# Patient Record
Sex: FEMALE | Race: WHITE | Employment: OTHER | URBAN - METROPOLITAN AREA
[De-identification: names, ages, dates, MRNs, and addresses within clinical notes are randomized per-mention and may not be internally consistent; named-entity substitution may affect disease eponyms.]

---

## 2022-11-15 ENCOUNTER — HOSPITAL ENCOUNTER (EMERGENCY)
Age: 52
Discharge: HOME | End: 2022-11-15
Payer: MEDICARE

## 2022-11-15 VITALS
DIASTOLIC BLOOD PRESSURE: 88 MMHG | HEART RATE: 76 BPM | SYSTOLIC BLOOD PRESSURE: 148 MMHG | OXYGEN SATURATION: 96 % | TEMPERATURE: 98.6 F | RESPIRATION RATE: 16 BRPM

## 2022-11-15 DIAGNOSIS — B37.2 YEAST DERMATITIS: Primary | ICD-10-CM

## 2022-11-15 PROCEDURE — 99283 EMERGENCY DEPT VISIT LOW MDM: CPT

## 2022-11-15 PROCEDURE — 6370000000 HC RX 637 (ALT 250 FOR IP): Performed by: NURSE PRACTITIONER

## 2022-11-15 PROCEDURE — 2500000003 HC RX 250 WO HCPCS: Performed by: NURSE PRACTITIONER

## 2022-11-15 RX ORDER — ACETAMINOPHEN 325 MG/1
650 TABLET ORAL ONCE
Status: COMPLETED | OUTPATIENT
Start: 2022-11-15 | End: 2022-11-15

## 2022-11-15 RX ORDER — FLUCONAZOLE 100 MG/1
150 TABLET ORAL ONCE
Status: COMPLETED | OUTPATIENT
Start: 2022-11-15 | End: 2022-11-15

## 2022-11-15 RX ORDER — FLUCONAZOLE 150 MG/1
150 TABLET ORAL ONCE
Qty: 1 TABLET | Refills: 0 | Status: SHIPPED | OUTPATIENT
Start: 2022-11-15 | End: 2022-11-15

## 2022-11-15 RX ADMIN — ACETAMINOPHEN 650 MG: 325 TABLET ORAL at 17:05

## 2022-11-15 RX ADMIN — MICONAZOLE NITRATE: 20 POWDER TOPICAL at 17:05

## 2022-11-15 RX ADMIN — FLUCONAZOLE 150 MG: 100 TABLET ORAL at 17:05

## 2022-11-15 ASSESSMENT — ENCOUNTER SYMPTOMS
RHINORRHEA: 0
COLOR CHANGE: 0
ABDOMINAL PAIN: 0
SORE THROAT: 0
SHORTNESS OF BREATH: 0
FACIAL SWELLING: 0

## 2022-11-15 NOTE — ED NOTES
Discharge instructions reviewed with patient. Patient verbalized understanding. All home medications have been reviewed, questions answered and patient voiced understanding. Given prescriptions, discharge instructions, and appointment times.       Dana Leal RN  11/15/22 9998

## 2022-11-15 NOTE — ED PROVIDER NOTES
Evaluated by [id number] [facility] [email or web address] Ã¨â€[id number]±Ã¨â€[id number]§[id number]€˜ Provider          [facility] [address] ED  EMERGENCY DEPARTMENT ENCOUNTER        Pt Name: Ingrid Bruner  MRN: [id number]  [place] 1970  Dateof evaluation: 11/15/2022  Provider: ASHLI Solorio CNP  PCP: [address]  ED Attending: No att. providers found    [address]       Chief Complaint   Patient presents with    Rash     Groin area x1 week, urgent care prescribed topical and oral steroids, pt finished oral steroids but reports increased pain, swelling, changing to purple in color. HISTORY OF PRESENTILLNESS   (Location/Symptom, Timing/Onset, Context/Setting, Quality, Duration, Modifying Factors, Severity)  Note limiting factors. Ingrid Bruner is a 45 y.o. female for rash to the left groin. Onset was 1 week. Context includes patient reports that she was seen in urgent care last week and was given hydrocortisone cream and a steroid pack. Patient states that her rash is not improving. She states it is burning and painful. Alleviating factors include nothing. Aggravating factors include nothing. Pain is 0/10. Nothing has been used for pain today. Nursing Notes were all reviewed and agreed with or any disagreements were addressed  in the HPI. REVIEW OF SYSTEMS    (2-9 systems for level 4, 10 or more for level 5)     Review of Systems   Constitutional:  Negative for activity change, appetite change and fever. HENT:  Negative for congestion, facial swelling, rhinorrhea and sore throat. Eyes:  Negative for visual disturbance. Respiratory:  Negative for shortness of breath. Cardiovascular:  Negative for chest pain. Gastrointestinal:  Negative for abdominal pain. Genitourinary:  Negative for difficulty urinating. Musculoskeletal:  Negative for arthralgias and myalgias. Skin:  Positive for rash. Negative for color change. Neurological:  Negative for dizziness and light-headedness.    Psychiatric/Behavioral: Negative for agitation. All other systems reviewed and are negative. Positives and Pertinent negatives as per HPI. Except as noted above in the ROS, all other systems were reviewed and negative. PAST MEDICAL HISTORY     Past Medical History:   Diagnosis Date    Anxiety     Bipolar disorder ([facility] [address]     Depression     Post traumatic stress disorder          SURGICAL HISTORY       Past Surgical History:   Procedure Laterality Date     SECTION      GALLBLADDER SURGERY           CURRENT MEDICATIONS       Discharge Medication List as of 11/15/2022  5:05 PM        CONTINUE these medications which have NOT CHANGED    Details   traZODone (DESYREL) 150 MG tablet Take 150 mg by mouth nightly. naproxen (NAPROSYN) 500 MG tablet Take 1 tablet by mouth daily. , Disp-30 tablet, R-0      omeprazole (PRILOSEC) 20 MG capsule Take 1 capsule by mouth Daily. , Disp-30 capsule, R-1      clonazePAM (KLONOPIN) 1 MG tablet Take 1 mg by mouth 2 times daily as needed. citalopram (CELEXA) 40 MG tablet Take 40 mg by mouth 2 times daily.                  ALLERGIES     Biaxin [clarithromycin], Penicillins, and Lisinopril    FAMILY HISTORY       Family History   Problem Relation Age of Onset    Heart Failure Mother     Heart Disease Maternal Grandmother           SOCIAL HISTORY       Social History     Socioeconomic History    Marital status: Single   Tobacco Use    Smoking status: Former    Smokeless tobacco: Never   Substance and Sexual Activity    Alcohol use: No    Drug use: No    Sexual activity: Never     Partners: Male       SCREENINGS    Dedra Coma Scale  Eye Opening: Spontaneous  Best Verbal Response: Oriented  Best Motor Response: Obeys commands  Dedra Coma Scale Score: 15        PHYSICAL EXAM  (up to 7 for level 4, 8 or more for level 5)     ED Triage Vitals [11/15/22 1610]   BP Temp Temp Source Heart Rate Resp SpO2 Height Weight   (!) 148/88 98.6 °F (37 °C) Oral 76 16 96 % -- --       Physical Exam  Constitutional:       Appearance: Normal appearance. She is well-developed. HENT:      Head: Normocephalic and atraumatic. Cardiovascular:      Rate and Rhythm: Normal rate. Pulmonary:      Effort: Pulmonary effort is normal. No respiratory distress. Abdominal:      General: There is no distension. Palpations: Abdomen is soft. Tenderness: There is no abdominal tenderness. Musculoskeletal:         General: Normal range of motion. Cervical back: Normal range of motion. Skin:     General: Skin is warm and dry. Findings: Erythema and rash present. Neurological:      Mental Status: She is alert and oriented to person, place, and time. DIAGNOSTIC RESULTS   LABS:    Labs Reviewed - No data to display        EKG: All EKG's are interpreted by the Emergency Department Physician who either signs or Co-signs this chart in the absence of a cardiologist.  Please see their note for interpretation of EKG. RADIOLOGY:           Interpretation per the Radiologist below, if available at the time of this note:    No orders to display     No results found. PROCEDURES   Unless otherwise noted below, none     Procedures           EMERGENCYDEPARTMENT COURSE and DIFFERENTIAL DIAGNOSIS/MDM:   Vitals:    Vitals:    11/15/22 1610   BP: (!) 148/88   Pulse: 76   Resp: 16   Temp: 98.6 °F (37 °C)   TempSrc: Oral   SpO2: 96%       Patient was given the following medications:  Medications   miconazole (MICOTIN) 2 % powder ( Topical Given 11/15/22 1705)   fluconazole (DIFLUCAN) tablet 150 mg (150 mg Oral Given 11/15/22 1705)   acetaminophen (TYLENOL) tablet 650 mg (650 mg Oral Given 11/15/22 1705)       Patient was seen and evaluated by myself. Patient here for concerns for a rash to her left groin. Patient reports that she was seen in urgent care approximately 1 week ago and was given hydrocortisone cream and a steroid Dosepak. Patient states that her rash has gotten worse.   On exam today she is awake and alert hemodynamically stable nontoxic in appearance. Patient does have a large blanchable erythematous rash that is consistent with yeast to the folds of her left groin. She was instructed to stop taking the steroids and using a steroid cream.  She was given Diflucan Tylenol and miconazole powder here. She be discharged home with Diflucan to take in 1 week. She was also given a prescription for miconazole powder. She was encouraged to return to the ED for worsening symptoms. She was also encouraged to follow-up with her primary care doctor in the next few days. Patient was ultimately discharged with all questions answered. Is this patient to be included in the SEP-1 Core Measure due to severe sepsis or septic shock? No   Exclusion criteria - the patient is NOT to be included for SEP-1 Core Measure due to: Infection is not suspected       Patient was seen during the time of the [place] pandemic. N95 and appropriate PPE was worn during the visit. The patient tolerated their visit well. I have evaluated this patient. My supervising physician was available for consultation. The patient and / or the family were informed of the results of any tests, a time was given to answer questions, a plan was proposed and they agreed with plan. FINAL IMPRESSION      1.  Yeast dermatitis          DISPOSITION/PLAN   DISPOSITION Decision To Discharge 11/15/2022 04:34:44 PM      PATIENT REFERRED TO:  Deonna Swanson MD  [address]  [phone]    Schedule an appointment as soon as possible for a visit in 2 days  for re-evaluation    [facility]  [address] [address]  [phone]    If symptoms worsen    [address] [facility] [phone]          DISCHARGE MEDICATIONS:  Discharge Medication List as of 11/15/2022  5:05 PM        START taking these medications    Details   Miconazole Nitrate 2 % SOLN Apply 1 Dose topically 2 times daily, Disp-1 each, R-1Print      fluconazole (DIFLUCAN) 150 MG tablet Take 1 tablet by mouth once for 1 dose Please take this dose in 1 week on  Nov 21.2022, Disp-1 tablet, R-0Print             DISCONTINUED MEDICATIONS:  Discharge Medication List as of 11/15/2022  5:05 PM                 (Please note that portions of this note were completed with a voice recognition program.  Efforts were made to edit the dictations but occasionally words are mis-transcribed.)    ASHLI Vargas CNP (electronically signed)         ASHLI Vargas CNP  11/15/22 2314

## 2022-11-25 ENCOUNTER — HOSPITAL ENCOUNTER (EMERGENCY)
Age: 52
Discharge: HOME | End: 2022-11-25
Payer: MEDICARE

## 2022-11-25 VITALS
SYSTOLIC BLOOD PRESSURE: 127 MMHG | DIASTOLIC BLOOD PRESSURE: 81 MMHG | RESPIRATION RATE: 14 BRPM | OXYGEN SATURATION: 96 % | HEART RATE: 79 BPM | TEMPERATURE: 98.2 F

## 2022-11-25 DIAGNOSIS — U07.1 COVID-19: Primary | ICD-10-CM

## 2022-11-25 DIAGNOSIS — R05.1 ACUTE COUGH: ICD-10-CM

## 2022-11-25 DIAGNOSIS — R09.89 CHEST CONGESTION: ICD-10-CM

## 2022-11-25 LAB
INFLUENZA A: NOT DETECTED
INFLUENZA B: NOT DETECTED
SARS-COV-2 RNA, RT PCR: DETECTED

## 2022-11-25 PROCEDURE — 99283 EMERGENCY DEPT VISIT LOW MDM: CPT

## 2022-11-25 PROCEDURE — 87636 SARSCOV2 & INF A&B AMP PRB: CPT

## 2022-11-25 RX ORDER — BROMPHENIRAMINE MALEATE, PSEUDOEPHEDRINE HYDROCHLORIDE, AND DEXTROMETHORPHAN HYDROBROMIDE 2; 30; 10 MG/5ML; MG/5ML; MG/5ML
10 SYRUP ORAL 4 TIMES DAILY PRN
Qty: 120 ML | Refills: 0 | Status: SHIPPED | OUTPATIENT
Start: 2022-11-25

## 2022-11-25 RX ORDER — ALBUTEROL SULFATE 90 UG/1
2 AEROSOL, METERED RESPIRATORY (INHALATION) 4 TIMES DAILY PRN
Qty: 18 G | Refills: 0 | Status: SHIPPED | OUTPATIENT
Start: 2022-11-25

## 2022-11-25 NOTE — ED PROVIDER NOTES
**ADVANCED PRACTICE PROVIDER, I HAVE EVALUATED THIS [facility] ED  EMERGENCY DEPARTMENT ENCOUNTER      Pt Name: Michelle Villar  LINDA:[id number]  [place] 1970  Date of evaluation: 2022  Provider: ASHLI Rose CNP  Note Started: 3:45 PM EST 2022        Chief Complaint:    Chief Complaint   Patient presents with    Other     Was COVID+, had negative home test Monday, wants another test today to make sure she is indeed negative. Nursing Notes, Past Medical Hx, Past Surgical Hx, Social Hx, Allergies, and Family Hx were all reviewed and agreed with or any disagreements were addressed in the HPI.    HPI: (Location, Duration, Timing, Severity, Quality, Assoc Sx, Context, Modifying factors)    Chief Complaint of cough congestion and body ache    This is a  45 y.o. female who presents today with cough, congestion, body aches, tactile fever and chills not feeling well since the , she took a home COVID test and was positive on Monday however, she thought maybe she was going to be better by today and was requesting a second test.  She is also here with her daughter who is symptomatic. She denies any chest pain, pleuritic chest pain or shortness of breath. Denies any headache neck pain neck stiffness. No nausea vomiting or diarrhea, she is not take any medicine for symptoms. She denies any additional complaints. No additional aggravating relieving factors.   Patient presents awake, alert and in no acute respiratory distress or toxic appearance    PastMedical/Surgical History:      Diagnosis Date    Anxiety     Bipolar disorder ([address]     Depression     Post traumatic stress disorder          Procedure Laterality Date     SECTION      GALLBLADDER SURGERY         Medications:  Discharge Medication List as of 2022  3:49 PM        CONTINUE these medications which have NOT CHANGED    Details   Miconazole Nitrate 2 % SOLN Apply 1 Dose topically 2 times daily, Disp-1 each, R-1Print      traZODone (DESYREL) 150 MG tablet Take 150 mg by mouth nightly. naproxen (NAPROSYN) 500 MG tablet Take 1 tablet by mouth daily. , Disp-30 tablet, R-0      omeprazole (PRILOSEC) 20 MG capsule Take 1 capsule by mouth Daily. , Disp-30 capsule, R-1      clonazePAM (KLONOPIN) 1 MG tablet Take 1 mg by mouth 2 times daily as needed. citalopram (CELEXA) 40 MG tablet Take 40 mg by mouth 2 times daily. Review of Systems:  (2-9 systems needed)  Review of Systems   Constitutional:  Negative for chills and fever. Patient complains of cough, congestion, body aches, tactile fever and chills not feeling well since the 21st, she took a home COVID test and was positive on Monday however, she thought maybe she was going to be better by today and was requesting a second test.  She is also here with her daughter who is symptomatic. She denies any chest pain, pleuritic chest pain or shortness of breath. HENT:  Positive for congestion. Respiratory:  Positive for cough. Negative for shortness of breath and wheezing. Cardiovascular:  Negative for chest pain. Gastrointestinal:  Negative for abdominal pain, diarrhea, nausea and vomiting. Genitourinary:  Negative for difficulty urinating, dysuria, frequency and hematuria. Musculoskeletal:  Positive for myalgias. Negative for back pain. Skin:  Negative for color change. Neurological:  Negative for weakness, numbness and headaches.      \"Positives and Pertinent negatives as per HPI\"    Physical Exam:  Physical Exam    MEDICAL DECISION MAKING    Vitals:    Vitals:    11/25/22 1322   BP: 127/81   Pulse: 79   Resp: 14   Temp: 98.2 °F (36.8 °C)   SpO2: 96%       LABS:  Labs Reviewed   COVID-19 & INFLUENZA COMBO - Abnormal; Notable for the following components:       Result Value    SARS-CoV-2 RNA, RT PCR DETECTED (*)     All other components within normal limits        Remainder of labs reviewed and were negative at this time or not returned at the time of this note. RADIOLOGY:   Non-plain film images such as CT, Ultrasound and MRI are read by the radiologist. Linda ODELL APRN - CNP have directly visualized the radiologic plain film image(s) with the below findings:      Interpretation per the Radiologist below, if available at the time of this note:    No orders to display        No results found. MEDICAL DECISION MAKING / ED COURSE:      PROCEDURES:   Procedures    None    Patient was given:  Medications - No data to display    Patient complains of cough, congestion, body aches, tactile fever and chills not feeling well since the 21st, she took a home COVID test and was positive on Monday however, she thought maybe she was going to be better by today and was requesting a second test.  She is also here with her daughter who is symptomatic. She denies any chest pain, pleuritic chest pain or shortness of breath. After evaluation and examination the patient COVID and flu swab were obtained. Patient is flu negative but COVID positive, her daughter lives here as the patient is actually flu positive. Educated both on symptomatic treatment however, The patient has COVID-19, but does not have HYPOXIA, TACHYCARDIA, TACHYPNEA, VOMITING, SIGNIFICANT CO-MORBIDITIES, TOXICITY, OR SEVERE SEPSIS, thus I consider the discharge disposition reasonable. Therefore, shared medical decision was made between the patient and myself only agreed the patient could be discharged home with outpatient follow-up. Patient was discharged home with referral back to PCP. Discharged home with cough medicine and albuterol, take medicine as prescribed. Return the ER for worsening concerning symptoms. The patient tolerated their visit well. I evaluated the patient. The physician was available for consultation as needed.   The patient and / or the family were informed of the results of any tests, a time was given to answer questions, a plan was proposed and they agreed with plan. Patient verbalized understanding of discharge instructions and patient was discharged from the department in stable condition    I am the Primary Clinician of Record. CLINICAL IMPRESSION:  1. COVID-19    2. Acute cough    3.  Chest congestion        DISPOSITION Decision To Discharge 11/25/2022 03:46:07 PM      PATIENT REFERRED TO:  [address]      Follow-up with your family doctor in the next 2 to 3 days for reevaluation    [facility]  [address] [address]  [phone]  Go to   If symptoms worsen    DISCHARGE MEDICATIONS:  Discharge Medication List as of 11/25/2022  3:49 PM        START taking these medications    Details   brompheniramine-pseudoephedrine-DM (BROMFED DM) 2-30-10 MG/5ML syrup Take 10 mLs by mouth 4 times daily as needed for Congestion or Cough, Disp-120 mL, R-0Print      albuterol sulfate HFA (VENTOLIN HFA) 108 (90 Base) MCG/ACT inhaler Inhale 2 puffs into the lungs 4 times daily as needed for Wheezing, Disp-18 g, R-0Print             DISCONTINUED MEDICATIONS:  Discharge Medication List as of 11/25/2022  3:49 PM                 (Please note the MDM and HPI sections of this note were completed with a voice recognition program.  Efforts were made to edit the dictations but occasionally words are mis-transcribed.)    Electronically signed, ASHLI Hu CNP,           ASHLI Hu CNP  11/26/22 8638

## 2022-11-26 ASSESSMENT — ENCOUNTER SYMPTOMS
COLOR CHANGE: 0
NAUSEA: 0
WHEEZING: 0
SHORTNESS OF BREATH: 0
ABDOMINAL PAIN: 0
DIARRHEA: 0
VOMITING: 0
COUGH: 1
BACK PAIN: 0

## 2023-01-04 ENCOUNTER — APPOINTMENT (OUTPATIENT)
Dept: GENERAL RADIOLOGY | Age: 53
End: 2023-01-04
Payer: MEDICARE

## 2023-01-04 ENCOUNTER — HOSPITAL ENCOUNTER (EMERGENCY)
Age: 53
Discharge: HOME | End: 2023-01-04
Attending: EMERGENCY MEDICINE
Payer: MEDICARE

## 2023-01-04 VITALS
HEIGHT: 67 IN | DIASTOLIC BLOOD PRESSURE: 86 MMHG | SYSTOLIC BLOOD PRESSURE: 155 MMHG | OXYGEN SATURATION: 96 % | WEIGHT: 235 LBS | BODY MASS INDEX: 36.88 KG/M2 | HEART RATE: 70 BPM | TEMPERATURE: 98.2 F | RESPIRATION RATE: 18 BRPM

## 2023-01-04 DIAGNOSIS — M25.572 ACUTE LEFT ANKLE PAIN: Primary | ICD-10-CM

## 2023-01-04 DIAGNOSIS — R03.0 ELEVATED BLOOD PRESSURE READING: ICD-10-CM

## 2023-01-04 PROCEDURE — 6370000000 HC RX 637 (ALT 250 FOR IP): Performed by: EMERGENCY MEDICINE

## 2023-01-04 PROCEDURE — 99283 EMERGENCY DEPT VISIT LOW MDM: CPT

## 2023-01-04 PROCEDURE — 73610 X-RAY EXAM OF ANKLE: CPT

## 2023-01-04 RX ORDER — AMLODIPINE BESYLATE 5 MG/1
TABLET ORAL
COMMUNITY
Start: 2022-11-16

## 2023-01-04 RX ORDER — NAPROXEN 500 MG/1
500 TABLET ORAL ONCE
Status: COMPLETED | OUTPATIENT
Start: 2023-01-04 | End: 2023-01-04

## 2023-01-04 RX ORDER — GABAPENTIN 300 MG/1
CAPSULE ORAL
COMMUNITY
Start: 2022-12-21

## 2023-01-04 RX ORDER — METOPROLOL TARTRATE 50 MG/1
50 TABLET, FILM COATED ORAL 2 TIMES DAILY
COMMUNITY
Start: 2017-09-28

## 2023-01-04 RX ADMIN — NAPROXEN 500 MG: 500 TABLET ORAL at 08:56

## 2023-01-04 ASSESSMENT — PAIN DESCRIPTION - ORIENTATION
ORIENTATION: LEFT
ORIENTATION: LEFT

## 2023-01-04 ASSESSMENT — PAIN SCALES - GENERAL
PAINLEVEL_OUTOF10: 10
PAINLEVEL_OUTOF10: 10

## 2023-01-04 ASSESSMENT — PAIN DESCRIPTION - LOCATION
LOCATION: ANKLE
LOCATION: ANKLE

## 2023-01-04 ASSESSMENT — PAIN - FUNCTIONAL ASSESSMENT
PAIN_FUNCTIONAL_ASSESSMENT: 0-10
PAIN_FUNCTIONAL_ASSESSMENT: ACTIVITIES ARE NOT PREVENTED

## 2023-01-04 ASSESSMENT — PAIN DESCRIPTION - DESCRIPTORS: DESCRIPTORS: SHOOTING

## 2023-01-04 NOTE — ED PROVIDER NOTES
[address] ED      CHIEF COMPLAINT  Ankle Pain (Pt states that this am she noticed that her left ankle was swollen and painful. Pt ambulatory to triage.)       HISTORY OF PRESENT ILLNESS  Isaac Dobbins is a 45 y.o. female  who presents to the ED complaining of left-sided ankle pain. Symptoms have been ongoing since the week between  and New Year's she thinks she may have twisted her ankle on her sidewalk. However, the pain acutely worsened last night. Is especially painful to the posterior medial aspect of the ankle and also slightly in the posterior lateral aspect. She denies any numbness or tingling. She states that the pain radiates up her leg. She did try to take ibuprofen for the pain without relief but did not take anything for pain today. No chest pain or shortness of breath. No fevers. She states that is more swollen and even \"purple\" in appearance last night. The swelling is still present today per the patient but is somewhat better. No other complaints, modifying factors or associated symptoms. I have reviewed the following from the nursing documentation.     Past Medical History:   Diagnosis Date    Anxiety     Bipolar disorder ([facility] [address]     COPD (chronic obstructive pulmonary disease) ([facility])     Depression     Diabetes mellitus ([facility] [address]     Hyperlipidemia     Hypertension     Post traumatic stress disorder      Past Surgical History:   Procedure Laterality Date     SECTION      GALLBLADDER SURGERY      KNEE ARTHROSCOPY W/ MENISCAL REPAIR       Family History   Problem Relation Age of Onset    Heart Failure Mother     Heart Disease Maternal Grandmother      Social History     Socioeconomic History    Marital status: Single     Spouse name: Not on file    Number of children: Not on file    Years of education: Not on file    Highest education level: Not on file   Occupational History    Not on file   Tobacco Use    Smoking status: Former    Smokeless tobacco: Never Substance and Sexual Activity    Alcohol use: No    Drug use: No    Sexual activity: Never     Partners: Male   Other Topics Concern    Not on file   Social History Narrative    Not on file     Social Determinants of Health     Financial Resource Strain: Not on file   Food Insecurity: Not on file   Transportation Needs: Not on file   Physical Activity: Not on file   Stress: Not on file   Social Connections: Not on file   Intimate Partner Violence: Not on file   Housing Stability: Not on file     No current facility-administered medications for this encounter. Current Outpatient Medications   Medication Sig Dispense Refill    metoprolol tartrate (LOPRESSOR) 50 MG tablet Take 50 mg by mouth 2 times daily      gabapentin (NEURONTIN) 300 MG capsule TAKE 1 CAPSULE BY ORAL ROUTE EVERY DAY      amLODIPine (NORVASC) 5 MG tablet TAKE 1 TABLET BY ORAL ROUTE EVERY DAY      metFORMIN (GLUCOPHAGE) 500 MG tablet TAKE 1 TABLET BY ORAL ROUTE 2 TIMES EVERY DAY WITH MORNING AND EVENING MEALS      brompheniramine-pseudoephedrine-DM (BROMFED DM) 2-30-10 MG/5ML syrup Take 10 mLs by mouth 4 times daily as needed for Congestion or Cough 120 mL 0    albuterol sulfate HFA (VENTOLIN HFA) 108 (90 Base) MCG/ACT inhaler Inhale 2 puffs into the lungs 4 times daily as needed for Wheezing 18 g 0    Miconazole Nitrate 2 % SOLN Apply 1 Dose topically 2 times daily (Patient not taking: Reported on 1/4/2023) 1 each 1    traZODone (DESYREL) 150 MG tablet Take 150 mg by mouth nightly. naproxen (NAPROSYN) 500 MG tablet Take 1 tablet by mouth daily. (Patient not taking: Reported on 1/4/2023) 30 tablet 0    omeprazole (PRILOSEC) 20 MG capsule Take 1 capsule by mouth Daily. (Patient not taking: Reported on 1/4/2023) 30 capsule 1    clonazePAM (KLONOPIN) 1 MG tablet Take 1 mg by mouth 2 times daily as needed. (Patient not taking: Reported on 1/4/2023)      citalopram (CELEXA) 40 MG tablet Take 40 mg by mouth 2 times daily.    (Patient not taking: Reported on 1/4/2023)       Allergies   Allergen Reactions    Biaxin [Clarithromycin] Hives    Penicillins Rash    Lisinopril Rash       REVIEW OF SYSTEMS  8 systems reviewed, pertinent positives per HPI otherwise noted to be negative. PHYSICAL EXAM  BP (!) 155/86   Pulse 70   Temp 98.2 °F (36.8 °C) (Oral)   Resp 18   Ht 5' 7\" (1.702 m)   Wt 235 lb (106.6 kg)   SpO2 96%   BMI 36.81 kg/m²    GENERAL APPEARANCE: Awake and alert. Cooperative. No acute distress. HENT: Normocephalic. Atraumatic. Mucous membranes are moist.  No drooling or stridor. NECK: Supple. EYES: PERRL. EOM's grossly intact. HEART/CHEST: RRR. No murmurs. 2+ DP and PT pulses on the left. LUNGS: Respirations unlabored. CTAB. Good air exchange. Speaking comfortably in full sentences. ABDOMEN: No tenderness. Soft. Non-distended. No masses. No organomegaly. No guarding or rebound. MUSCULOSKELETAL:  Compartments soft. No deformity. Diffuse tenderness to the patient's left ankle especially posterior and inferior to the medial malleolus. There is no discoloration, erythema or warmth. Mild soft tissue swelling. No focal tenderness along the fifth metatarsal or in the midfoot. No tenderness proximally into the calf or knee. All extremities neurovascularly intact. SKIN: Warm and dry. No acute rashes. NEUROLOGICAL: Alert and oriented. CN's 2-12 intact. No gross facial drooping. Motor and sensory intact distally in the left lower extremity  PSYCHIATRIC: Normal mood and affect. LABS  I have reviewed all labs for this visit. No results found for this visit on 01/04/23. RADIOLOGY  XR ANKLE LEFT (MIN 3 VIEWS)    Result Date: 1/4/2023  EXAMINATION: THREE XRAY VIEWS OF THE LEFT ANKLE 1/4/2023 8:44 am COMPARISON: None. HISTORY: ORDERING SYSTEM PROVIDED HISTORY: pain, ?inversion injury last week. TECHNOLOGIST PROVIDED HISTORY: Reason for exam:->pain, ?inversion injury last week. Reason for Exam: pain, ?inversion injury last week. FINDINGS: Well corticated bony ossicle noted at the inferior tip of the lateral malleolus likely related to prior trauma. Minimal spurring in the tip of the medial malleolus. Mild arthrosis of the talonavicular joint. Small calcaneal plantar heel spur noted. Small Enthesopathic changes at Achilles insertion on calcaneum No acute osseous injury     No acute osseous injury. Mild degenerative changes left ankle        ED COURSE/MDM  Patient presenting for evaluation of left ankle pain. No clinical evidence of cellulitis which was considered but I do not see any evidence of this. She has good distal pulses without any neurovascular compromise to suggest underlying arterial occlusion or any vascular injury or neurologic injury. She does not specifically recall an injury but may have inverted it. Certainly concern at that point for possible sprain or strain. She was given Naprosyn for pain control with some improvement. She has been able to bear weight on it. Plain films were negative for any fracture or dislocation but does show some mild degenerative changes. Will treat supportively. I ordered an Ace bandage and Aircast as well as crutches so that she can weight-bear as tolerated but try to stay off of it some. Unfortunately, I was notified by the charge nurse that she was only given the Ace bandage before she was discharged but I felt that supportive treatment was most appropriate with close follow-up with orthopedics. She was also noted to have elevated blood pressure and was advised to follow-up closely with her PCP for recheck of this as well and to continue her other home medications as currently prescribed. We discussed rest, ice and elevation as well as exercises to help with ankle sprain recovery. Patient was discharged home with close outpatient follow-up as noted above.       I have personally reviewed Xray ankle images and radiology confirms the interpretation:  I do not appreciate any fracture or dislocation. Sepsis:  Is this patient to be included in the SEP-1 Core Measure due to severe sepsis or septic shock? No   Exclusion criteria - the patient is NOT to be included for SEP-1 Core Measure due to: Infection is not suspected       Imaging reviewed and results discussed with patient. Patient was reassessed as noted above . Plan of care discussed with patient. Patient in agreement with plan. Strict return precautions have been given. I, Dr. Camden Hernandez MD, am the primary clinician of record. During the patient's ED course, the patient was given:  Medications   naproxen (NAPROSYN) tablet 500 mg (500 mg Oral Given 1/4/23 0856)        CLINICAL IMPRESSION  1. Acute left ankle pain    2. Elevated blood pressure reading        Blood pressure (!) 155/86, pulse 70, temperature 98.2 °F (36.8 °C), temperature source Oral, resp. rate 18, height 5' 7\" (1.702 m), weight 235 lb (106.6 kg), SpO2 96 %. DISPOSITION  Jules Bhatia was discharged to home in stable condition. Patient was given scripts for the following medications. I counseled patient how to take these medications. Discharge Medication List as of 1/4/2023  9:45 AM          Follow-up with:  Tre Peguero MD  [address]  [phone]    Schedule an appointment as soon as possible for a visit   to follow up with orthopedics    [address]      As needed and to have your blood pressure rechecked, which was elevated here today    DISCLAIMER: This chart was created using Dragon dictation software. Efforts were made by me to ensure accuracy, however some errors may be present due to limitations of this technology and occasionally words are not transcribed correctly.         Camden Hernandez MD  01/04/23 9237

## 2023-01-04 NOTE — ED NOTES
Patient did not recieve air cast or crutches per order due to writer not seeing complete order. Dr. Jayda Oshea notified.  Patient educated on follow up information and referral.      Sanjiv Brumfield RN  01/04/23 2769

## 2023-01-10 ENCOUNTER — HOSPITAL ENCOUNTER (OUTPATIENT)
Dept: MAMMOGRAPHY | Age: 53
Discharge: HOME | End: 2023-01-10
Payer: MEDICARE

## 2023-01-10 DIAGNOSIS — Z12.39 SCREENING BREAST EXAMINATION: ICD-10-CM

## 2023-01-10 PROCEDURE — 77067 SCR MAMMO BI INCL CAD: CPT

## 2023-01-19 ENCOUNTER — TELEPHONE (OUTPATIENT)
Dept: ORTHOPEDIC SURGERY | Age: 53
End: 2023-01-19

## 2023-01-19 ENCOUNTER — OFFICE VISIT (OUTPATIENT)
Dept: ORTHOPEDIC SURGERY | Age: 53
End: 2023-01-19

## 2023-01-19 VITALS — WEIGHT: 235 LBS | HEIGHT: 67 IN | BODY MASS INDEX: 36.88 KG/M2

## 2023-01-19 DIAGNOSIS — S93.402A SPRAIN OF LEFT ANKLE, UNSPECIFIED LIGAMENT, INITIAL ENCOUNTER: Primary | ICD-10-CM

## 2023-01-19 DIAGNOSIS — M25.572 LEFT ANKLE PAIN, UNSPECIFIED CHRONICITY: ICD-10-CM

## 2023-01-19 RX ORDER — MELOXICAM 15 MG/1
15 TABLET ORAL DAILY
Qty: 30 TABLET | Refills: 0 | Status: SHIPPED | OUTPATIENT
Start: 2023-01-19

## 2023-01-19 NOTE — PROGRESS NOTES
Chief Complaint    Ankle Pain (Left Ankle )      History of Present Illness:  Tarun Mary is a 45 y.o. female who presents to the office today for new problem. Patient here with a chief complaint of left ankle pain. She is complaining of more heel pain than anything. She states that approximately 15 days ago she slipped and rolled her ankle. She demonstrates an inversion injury. Pain is more concentrated over the medial part of her ankle and her heel. She did have Achilles tendon pain also. She was seen in the emergency room and x-rays were taken. It was negative for fractures.   She has been using NSAIDs, activity modifications, and ice without improvement    Medical History:  Past Medical History:   Diagnosis Date    Anxiety     Bipolar disorder ([facility] [address]     COPD (chronic obstructive pulmonary disease) ([facility] [address]     Depression     Diabetes mellitus ([facility] [address]     Hyperlipidemia     Hypertension     Post traumatic stress disorder      Patient Active Problem List    Diagnosis Date Noted    Anxiety 2012    Bipolar 1 disorder, depressed ([facility] [address] 2012    Back pain 2012     Past Surgical History:   Procedure Laterality Date     SECTION      GALLBLADDER SURGERY      KNEE ARTHROSCOPY W/ MENISCAL REPAIR       Family History   Problem Relation Age of Onset    Heart Failure Mother     Heart Disease Maternal Grandmother      Social History     Socioeconomic History    Marital status: Single     Spouse name: None    Number of children: None    Years of education: None    Highest education level: None   Tobacco Use    Smoking status: Former    Smokeless tobacco: Never   Substance and Sexual Activity    Alcohol use: No    Drug use: No    Sexual activity: Never     Partners: Male     Current Outpatient Medications   Medication Sig Dispense Refill    gabapentin (NEURONTIN) 300 MG capsule TAKE 1 CAPSULE BY ORAL ROUTE EVERY DAY      amLODIPine (NORVASC) 5 MG tablet TAKE 1 TABLET BY ORAL ROUTE EVERY DAY metFORMIN (GLUCOPHAGE) 500 MG tablet TAKE 1 TABLET BY ORAL ROUTE 2 TIMES EVERY DAY WITH MORNING AND EVENING MEALS      metoprolol tartrate (LOPRESSOR) 50 MG tablet Take 50 mg by mouth 2 times daily      brompheniramine-pseudoephedrine-DM (BROMFED DM) 2-30-10 MG/5ML syrup Take 10 mLs by mouth 4 times daily as needed for Congestion or Cough 120 mL 0    albuterol sulfate HFA (VENTOLIN HFA) 108 (90 Base) MCG/ACT inhaler Inhale 2 puffs into the lungs 4 times daily as needed for Wheezing 18 g 0    Miconazole Nitrate 2 % SOLN Apply 1 Dose topically 2 times daily (Patient not taking: Reported on 1/4/2023) 1 each 1    traZODone (DESYREL) 150 MG tablet Take 150 mg by mouth nightly. naproxen (NAPROSYN) 500 MG tablet Take 1 tablet by mouth daily. (Patient not taking: Reported on 1/4/2023) 30 tablet 0    omeprazole (PRILOSEC) 20 MG capsule Take 1 capsule by mouth Daily. (Patient not taking: Reported on 1/4/2023) 30 capsule 1    clonazePAM (KLONOPIN) 1 MG tablet Take 1 mg by mouth 2 times daily as needed. (Patient not taking: Reported on 1/4/2023)      citalopram (CELEXA) 40 MG tablet Take 40 mg by mouth 2 times daily. (Patient not taking: Reported on 1/4/2023)       No current facility-administered medications for this visit. Review of Systems:  Relevant point review of systems dated 1/19/2023 was reviewed and all pertinent positives were reviewed and are available in the patient's chart under the media tab      Vital Signs:  Ht 5' 7\" (1.702 m)   Wt 235 lb (106.6 kg)   BMI 36.81 kg/m²     General Exam:   Constitutional: Patient is adequately groomed with no evidence of malnutrition  DTRs: Deep tendon reflexes are intact  Mental Status: The patient is oriented to time, place and person. The patient's mood and affect are appropriate. Lymphatic: The lymphatic examination bilaterally reveals all areas to be without enlargement or induration. Vascular: Examination reveals no swelling or calf tenderness. Peripheral pulses are palpable and 2+. Neurological: The patient has good coordination. There is no weakness or sensory deficit. Left ankle Examination:    Inspection:  Swelling and ecchymosis surrounding the medial ankle    Palpation:  Diffuse tenderness to palpation but most pronounced over the medial deltoid ligament. Range of Motion:  Limited range of motion due to pain and swelling    Strength:  Intact but limited due to pain and swelling    Special Tests:  Positive anterior drawer. Positive talar tilt. Positive patellofemoral grind test    Skin: There are no rashes, ulcerations or lesions. Gait: Altered    Reflex 2+ and symmetric    Additional Comments:       Additional Examinations:         Right Lower Extremity: Examination of the right lower extremity does not show any tenderness, deformity or injury. Range of motion is unremarkable. There is no gross instability. There are no rashes, ulcerations or lesions. Strength and tone are normal.     Radiology:     X-rays obtained and reviewed in office:  Views 3 views including AP, lateral, and oblique  Location left ankle  Impression no evidence of any acute fractures or dislocations. There is soft tissue swelling noted. Ankle mortise is congruent well-maintained. Mild arthritic changes. Impression:  Encounter Diagnoses   Name Primary?     Left ankle pain, unspecified chronicity     Sprain of left ankle, unspecified ligament, initial encounter Yes       Office Procedures:  Orders Placed This Encounter   Procedures    XR ANKLE LEFT (MIN 3 VIEWS)     Standing Status:   Future     Number of Occurrences:   1     Standing Expiration Date:   1/17/2024    MRI ANKLE LEFT WO CONTRAST     Standing Status:   Future     Standing Expiration Date:   1/19/2024     Scheduling Instructions:      [employer]     Order Specific Question:   Reason for exam:     Answer:   Evaluate calcaneous fracture    Breg Tall Radha Walking Boot     Patient was prescribed a Breg Tall exozet Walking Boot. The left foot will require stabilization / immobilization from this semi-rigid / rigid orthosis to improve their function. The orthosis will assist in protecting the affected area, provide functional support and facilitate healing. Patient was instructed to progress ambulation weight bearing as tolerated in the device. The patient was educated and fit by a healthcare professional with expert knowledge and specialization in brace application while under the direct supervision of the physician. Verbal and written instructions for the use of and application of this item were provided. They were instructed to contact the office immediately should the brace result in increased pain, decreased sensation, increased swelling or worsening of the condition. Treatment Plan:     Patient is suffering with an acute left ankle sprain. With her having a positive calcaneal squeeze test and more tenderness medially I am concerned about a possible calcaneus fracture. I have placed her in a boot. She can be weightbearing as tolerated. I will order her an MRI and have her follow-up in the office afterwards. She should continue to rest ice and elevate. Mobic sent to pharmacy. I discussed with Scott Lange that her history, symptoms, signs, and imaging are most consistent with  ankle sprain . We reviewed the natural history of these conditions and treatment options ranging from conservative measures (rest, icing, activity modification, physical therapy, pain meds, cortisone injection) to surgical options. Based on her symptoms I recommended the following imaging studies: MRI. Script was provided. After imaging is completed, patient will make a follow up appointment to review imaging. In terms of treatment, I recommended continuing with rest, icing, avoidance of painful activities, NSAIDs or pain meds as tolerated, and physical therapy.      We discussed surgical options as well, should conservative measures fail.

## 2023-01-19 NOTE — TELEPHONE ENCOUNTER
MRI LEFT ANKLE approved auth# [id number]     [facility]/ Patient/ Follow up in Office w/ Rohan Mckoy

## 2023-02-17 DIAGNOSIS — S93.402A SPRAIN OF LEFT ANKLE, UNSPECIFIED LIGAMENT, INITIAL ENCOUNTER: ICD-10-CM

## 2023-02-17 RX ORDER — MELOXICAM 15 MG/1
15 TABLET ORAL DAILY
Qty: 30 TABLET | Refills: 0 | Status: SHIPPED | OUTPATIENT
Start: 2023-02-17

## 2023-03-21 DIAGNOSIS — S93.402A SPRAIN OF LEFT ANKLE, UNSPECIFIED LIGAMENT, INITIAL ENCOUNTER: ICD-10-CM

## 2023-03-21 RX ORDER — MELOXICAM 15 MG/1
15 TABLET ORAL DAILY
Qty: 30 TABLET | Refills: 0 | Status: SHIPPED | OUTPATIENT
Start: 2023-03-21

## 2023-06-20 ENCOUNTER — HOSPITAL ENCOUNTER (EMERGENCY)
Age: 53
Discharge: HOME | End: 2023-06-21
Payer: MEDICARE

## 2023-06-20 ENCOUNTER — APPOINTMENT (OUTPATIENT)
Dept: GENERAL RADIOLOGY | Age: 53
End: 2023-06-20
Payer: MEDICARE

## 2023-06-20 DIAGNOSIS — R50.9 FEVER IN ADULT: ICD-10-CM

## 2023-06-20 DIAGNOSIS — B34.9 VIRAL ILLNESS: Primary | ICD-10-CM

## 2023-06-20 LAB
ALBUMIN SERPL-MCNC: 3.9 G/DL (ref 3.4–5)
ALBUMIN/GLOB SERPL: 1.1 {RATIO} (ref 1.1–2.2)
ALP SERPL-CCNC: 114 U/L (ref 40–129)
ALT SERPL-CCNC: 36 U/L (ref 10–40)
ANION GAP SERPL CALCULATED.3IONS-SCNC: 11 MMOL/L (ref 3–16)
AST SERPL-CCNC: 51 U/L (ref 15–37)
BACTERIA URNS QL MICRO: ABNORMAL /HPF
BASOPHILS # BLD: 0 K/UL (ref 0–0.2)
BASOPHILS NFR BLD: 0.4 %
BILIRUB SERPL-MCNC: 1.1 MG/DL (ref 0–1)
BILIRUB UR QL STRIP.AUTO: NEGATIVE
BUN SERPL-MCNC: 17 MG/DL (ref 7–20)
CALCIUM SERPL-MCNC: 9.3 MG/DL (ref 8.3–10.6)
CHLORIDE SERPL-SCNC: 99 MMOL/L (ref 99–110)
CLARITY UR: CLEAR
CO2 SERPL-SCNC: 25 MMOL/L (ref 21–32)
COLOR UR: YELLOW
CREAT SERPL-MCNC: 0.6 MG/DL (ref 0.6–1.1)
DEPRECATED RDW RBC AUTO: 15.5 % (ref 12.4–15.4)
EOSINOPHIL # BLD: 0.1 K/UL (ref 0–0.6)
EOSINOPHIL NFR BLD: 1.1 %
EPI CELLS #/AREA URNS HPF: ABNORMAL /HPF (ref 0–5)
GFR SERPLBLD CREATININE-BSD FMLA CKD-EPI: >60 ML/MIN/{1.73_M2}
GLUCOSE SERPL-MCNC: 102 MG/DL (ref 70–99)
GLUCOSE UR STRIP.AUTO-MCNC: NEGATIVE MG/DL
HCG SERPL QL: NEGATIVE
HCT VFR BLD AUTO: 36.3 % (ref 36–48)
HGB BLD-MCNC: 12.1 G/DL (ref 12–16)
HGB UR QL STRIP.AUTO: ABNORMAL
KETONES UR STRIP.AUTO-MCNC: NEGATIVE MG/DL
LACTATE BLDV-SCNC: 1.1 MMOL/L (ref 0.4–1.9)
LEUKOCYTE ESTERASE UR QL STRIP.AUTO: NEGATIVE
LYMPHOCYTES # BLD: 1.2 K/UL (ref 1–5.1)
LYMPHOCYTES NFR BLD: 14.3 %
MCH RBC QN AUTO: 27.5 PG (ref 26–34)
MCHC RBC AUTO-ENTMCNC: 33.2 G/DL (ref 31–36)
MCV RBC AUTO: 82.9 FL (ref 80–100)
MONOCYTES # BLD: 0.6 K/UL (ref 0–1.3)
MONOCYTES NFR BLD: 7.1 %
NEUTROPHILS # BLD: 6.5 K/UL (ref 1.7–7.7)
NEUTROPHILS NFR BLD: 77.1 %
NITRITE UR QL STRIP.AUTO: NEGATIVE
PH UR STRIP.AUTO: 6 [PH] (ref 5–8)
PLATELET # BLD AUTO: 210 K/UL (ref 135–450)
PMV BLD AUTO: 9.2 FL (ref 5–10.5)
POTASSIUM SERPL-SCNC: 5.2 MMOL/L (ref 3.5–5.1)
PROT SERPL-MCNC: 7.4 G/DL (ref 6.4–8.2)
PROT UR STRIP.AUTO-MCNC: NEGATIVE MG/DL
RBC # BLD AUTO: 4.38 M/UL (ref 4–5.2)
RBC #/AREA URNS HPF: ABNORMAL /HPF (ref 0–4)
S PYO AG THROAT QL: NEGATIVE
SARS-COV-2 RDRP RESP QL NAA+PROBE: NOT DETECTED
SODIUM SERPL-SCNC: 135 MMOL/L (ref 136–145)
SP GR UR STRIP.AUTO: 1.01 (ref 1–1.03)
UA COMPLETE W REFLEX CULTURE PNL UR: ABNORMAL
UA DIPSTICK W REFLEX MICRO PNL UR: YES
URN SPEC COLLECT METH UR: ABNORMAL
UROBILINOGEN UR STRIP-ACNC: 0.2 E.U./DL
WBC # BLD AUTO: 8.5 K/UL (ref 4–11)
WBC #/AREA URNS HPF: ABNORMAL /HPF (ref 0–5)

## 2023-06-20 PROCEDURE — 71045 X-RAY EXAM CHEST 1 VIEW: CPT

## 2023-06-20 PROCEDURE — 6370000000 HC RX 637 (ALT 250 FOR IP): Performed by: PHYSICIAN ASSISTANT

## 2023-06-20 PROCEDURE — 80053 COMPREHEN METABOLIC PANEL: CPT

## 2023-06-20 PROCEDURE — 81001 URINALYSIS AUTO W/SCOPE: CPT

## 2023-06-20 PROCEDURE — 87081 CULTURE SCREEN ONLY: CPT

## 2023-06-20 PROCEDURE — 96374 THER/PROPH/DIAG INJ IV PUSH: CPT

## 2023-06-20 PROCEDURE — 87880 STREP A ASSAY W/OPTIC: CPT

## 2023-06-20 PROCEDURE — 99284 EMERGENCY DEPT VISIT MOD MDM: CPT

## 2023-06-20 PROCEDURE — 87040 BLOOD CULTURE FOR BACTERIA: CPT

## 2023-06-20 PROCEDURE — 84703 CHORIONIC GONADOTROPIN ASSAY: CPT

## 2023-06-20 PROCEDURE — 85025 COMPLETE CBC W/AUTO DIFF WBC: CPT

## 2023-06-20 PROCEDURE — 6360000002 HC RX W HCPCS: Performed by: PHYSICIAN ASSISTANT

## 2023-06-20 PROCEDURE — 2580000003 HC RX 258: Performed by: PHYSICIAN ASSISTANT

## 2023-06-20 PROCEDURE — 83605 ASSAY OF LACTIC ACID: CPT

## 2023-06-20 PROCEDURE — 87635 SARS-COV-2 COVID-19 AMP PRB: CPT

## 2023-06-20 RX ORDER — ACETAMINOPHEN 500 MG
1000 TABLET ORAL ONCE
Status: COMPLETED | OUTPATIENT
Start: 2023-06-20 | End: 2023-06-20

## 2023-06-20 RX ORDER — ONDANSETRON 2 MG/ML
4 INJECTION INTRAMUSCULAR; INTRAVENOUS ONCE
Status: COMPLETED | OUTPATIENT
Start: 2023-06-20 | End: 2023-06-20

## 2023-06-20 RX ORDER — IBUPROFEN 600 MG/1
600 TABLET ORAL ONCE
Status: COMPLETED | OUTPATIENT
Start: 2023-06-20 | End: 2023-06-20

## 2023-06-20 RX ORDER — 0.9 % SODIUM CHLORIDE 0.9 %
1000 INTRAVENOUS SOLUTION INTRAVENOUS ONCE
Status: COMPLETED | OUTPATIENT
Start: 2023-06-20 | End: 2023-06-20

## 2023-06-20 RX ADMIN — SODIUM CHLORIDE 1000 ML: 9 INJECTION, SOLUTION INTRAVENOUS at 21:42

## 2023-06-20 RX ADMIN — IBUPROFEN 600 MG: 600 TABLET, FILM COATED ORAL at 21:43

## 2023-06-20 RX ADMIN — ACETAMINOPHEN 1000 MG: 500 TABLET ORAL at 21:43

## 2023-06-20 RX ADMIN — ONDANSETRON 4 MG: 2 INJECTION INTRAMUSCULAR; INTRAVENOUS at 21:44

## 2023-06-21 VITALS
HEIGHT: 67 IN | SYSTOLIC BLOOD PRESSURE: 123 MMHG | HEART RATE: 76 BPM | BODY MASS INDEX: 37.67 KG/M2 | RESPIRATION RATE: 16 BRPM | DIASTOLIC BLOOD PRESSURE: 64 MMHG | OXYGEN SATURATION: 94 % | WEIGHT: 240 LBS | TEMPERATURE: 99.8 F

## 2023-06-21 RX ORDER — IBUPROFEN 600 MG/1
600 TABLET ORAL
Qty: 30 TABLET | Refills: 0 | Status: SHIPPED | OUTPATIENT
Start: 2023-06-21

## 2023-06-21 RX ORDER — ACETAMINOPHEN 500 MG
1000 TABLET ORAL
Qty: 40 TABLET | Refills: 0 | Status: SHIPPED | OUTPATIENT
Start: 2023-06-21

## 2023-06-21 NOTE — DISCHARGE INSTRUCTIONS
All laboratory studies were normal or within normal limits. No sign of pneumonia, strep or COVID. Renal function normal.  Liver function normal.  At this time I believe you have a virus infection. I do recommend ibuprofen 600 mg 3 times daily and Tylenol 1000 mg 3 times daily. Increase your fluid intake. Maintain good nutrition. Follow-up with healthcare provider if not improved later this week. I did send prescriptions to your local [address] [place] [address]

## 2023-06-22 LAB — S PYO THROAT QL CULT: NORMAL

## 2023-06-24 LAB
BACTERIA BLD CULT ORG #2: NORMAL
BACTERIA BLD CULT: NORMAL

## 2023-07-09 ENCOUNTER — APPOINTMENT (OUTPATIENT)
Dept: CT IMAGING | Age: 53
End: 2023-07-09
Payer: MEDICARE

## 2023-07-09 ENCOUNTER — HOSPITAL ENCOUNTER (EMERGENCY)
Age: 53
Discharge: HOME | End: 2023-07-09
Attending: EMERGENCY MEDICINE
Payer: MEDICARE

## 2023-07-09 VITALS
HEART RATE: 78 BPM | HEIGHT: 67 IN | RESPIRATION RATE: 15 BRPM | DIASTOLIC BLOOD PRESSURE: 91 MMHG | BODY MASS INDEX: 38.45 KG/M2 | TEMPERATURE: 98.3 F | WEIGHT: 245 LBS | SYSTOLIC BLOOD PRESSURE: 147 MMHG | OXYGEN SATURATION: 94 %

## 2023-07-09 DIAGNOSIS — K60.2 ANAL FISSURE: ICD-10-CM

## 2023-07-09 DIAGNOSIS — K64.4 EXTERNAL HEMORRHOID: ICD-10-CM

## 2023-07-09 DIAGNOSIS — K62.5 RECTAL BLEEDING: Primary | ICD-10-CM

## 2023-07-09 DIAGNOSIS — R16.2 HEPATOSPLENOMEGALY: ICD-10-CM

## 2023-07-09 DIAGNOSIS — K57.90 DIVERTICULOSIS: ICD-10-CM

## 2023-07-09 LAB
ALBUMIN SERPL-MCNC: 4.3 G/DL (ref 3.4–5)
ALBUMIN/GLOB SERPL: 1.5 {RATIO} (ref 1.1–2.2)
ALP SERPL-CCNC: 83 U/L (ref 40–129)
ALT SERPL-CCNC: 38 U/L (ref 10–40)
ANION GAP SERPL CALCULATED.3IONS-SCNC: 8 MMOL/L (ref 3–16)
AST SERPL-CCNC: 41 U/L (ref 15–37)
BACTERIA URNS QL MICRO: ABNORMAL /HPF
BASOPHILS # BLD: 0.1 K/UL (ref 0–0.2)
BASOPHILS NFR BLD: 0.9 %
BILIRUB SERPL-MCNC: 0.5 MG/DL (ref 0–1)
BILIRUB UR QL STRIP.AUTO: NEGATIVE
BUN SERPL-MCNC: 10 MG/DL (ref 7–20)
CALCIUM SERPL-MCNC: 9.6 MG/DL (ref 8.3–10.6)
CHLORIDE SERPL-SCNC: 104 MMOL/L (ref 99–110)
CLARITY UR: CLEAR
CO2 SERPL-SCNC: 27 MMOL/L (ref 21–32)
COLOR UR: YELLOW
CREAT SERPL-MCNC: <0.5 MG/DL (ref 0.6–1.1)
DEPRECATED RDW RBC AUTO: 16 % (ref 12.4–15.4)
EOSINOPHIL # BLD: 0.1 K/UL (ref 0–0.6)
EOSINOPHIL NFR BLD: 2.1 %
EPI CELLS #/AREA URNS HPF: ABNORMAL /HPF (ref 0–5)
GFR SERPLBLD CREATININE-BSD FMLA CKD-EPI: >60 ML/MIN/{1.73_M2}
GLUCOSE SERPL-MCNC: 111 MG/DL (ref 70–99)
GLUCOSE UR STRIP.AUTO-MCNC: NEGATIVE MG/DL
HCT VFR BLD AUTO: 33.5 % (ref 36–48)
HGB BLD-MCNC: 11.1 G/DL (ref 12–16)
HGB UR QL STRIP.AUTO: ABNORMAL
KETONES UR STRIP.AUTO-MCNC: NEGATIVE MG/DL
LEUKOCYTE ESTERASE UR QL STRIP.AUTO: NEGATIVE
LYMPHOCYTES # BLD: 1.6 K/UL (ref 1–5.1)
LYMPHOCYTES NFR BLD: 29 %
MCH RBC QN AUTO: 27.8 PG (ref 26–34)
MCHC RBC AUTO-ENTMCNC: 33 G/DL (ref 31–36)
MCV RBC AUTO: 84.1 FL (ref 80–100)
MONOCYTES # BLD: 0.5 K/UL (ref 0–1.3)
MONOCYTES NFR BLD: 8.5 %
NEUTROPHILS # BLD: 3.4 K/UL (ref 1.7–7.7)
NEUTROPHILS NFR BLD: 59.5 %
NITRITE UR QL STRIP.AUTO: NEGATIVE
PH UR STRIP.AUTO: 8 [PH] (ref 5–8)
PLATELET # BLD AUTO: 256 K/UL (ref 135–450)
PMV BLD AUTO: 8.8 FL (ref 5–10.5)
POTASSIUM SERPL-SCNC: 4.2 MMOL/L (ref 3.5–5.1)
PROT SERPL-MCNC: 7.1 G/DL (ref 6.4–8.2)
PROT UR STRIP.AUTO-MCNC: NEGATIVE MG/DL
RBC # BLD AUTO: 3.98 M/UL (ref 4–5.2)
RBC #/AREA URNS HPF: ABNORMAL /HPF (ref 0–4)
SODIUM SERPL-SCNC: 139 MMOL/L (ref 136–145)
SP GR UR STRIP.AUTO: 1.01 (ref 1–1.03)
UA COMPLETE W REFLEX CULTURE PNL UR: ABNORMAL
UA DIPSTICK W REFLEX MICRO PNL UR: YES
URN SPEC COLLECT METH UR: ABNORMAL
UROBILINOGEN UR STRIP-ACNC: 0.2 E.U./DL
WBC # BLD AUTO: 5.7 K/UL (ref 4–11)
WBC #/AREA URNS HPF: ABNORMAL /HPF (ref 0–5)

## 2023-07-09 PROCEDURE — 6360000004 HC RX CONTRAST MEDICATION: Performed by: NURSE PRACTITIONER

## 2023-07-09 PROCEDURE — 74177 CT ABD & PELVIS W/CONTRAST: CPT

## 2023-07-09 PROCEDURE — 80053 COMPREHEN METABOLIC PANEL: CPT

## 2023-07-09 PROCEDURE — 99285 EMERGENCY DEPT VISIT HI MDM: CPT

## 2023-07-09 PROCEDURE — 6370000000 HC RX 637 (ALT 250 FOR IP): Performed by: NURSE PRACTITIONER

## 2023-07-09 PROCEDURE — 81001 URINALYSIS AUTO W/SCOPE: CPT

## 2023-07-09 PROCEDURE — 85025 COMPLETE CBC W/AUTO DIFF WBC: CPT

## 2023-07-09 PROCEDURE — 36415 COLL VENOUS BLD VENIPUNCTURE: CPT

## 2023-07-09 RX ORDER — HYDROCORTISONE 25 MG/G
CREAM TOPICAL 2 TIMES DAILY
Status: DISCONTINUED | OUTPATIENT
Start: 2023-07-09 | End: 2023-07-09 | Stop reason: HOSPADM

## 2023-07-09 RX ORDER — HYDROCORTISONE 25 MG/G
CREAM TOPICAL
Qty: 1 EACH | Refills: 1 | Status: SHIPPED | OUTPATIENT
Start: 2023-07-09

## 2023-07-09 RX ORDER — HYDROCODONE BITARTRATE AND ACETAMINOPHEN 5; 325 MG/1; MG/1
1 TABLET ORAL ONCE
Status: COMPLETED | OUTPATIENT
Start: 2023-07-09 | End: 2023-07-09

## 2023-07-09 RX ADMIN — IOPAMIDOL 75 ML: 755 INJECTION, SOLUTION INTRAVENOUS at 17:46

## 2023-07-09 RX ADMIN — HYDROCORTISONE 2.5%: 25 CREAM TOPICAL at 19:16

## 2023-07-09 RX ADMIN — HYDROCODONE BITARTRATE AND ACETAMINOPHEN 1 TABLET: 5; 325 TABLET ORAL at 16:45

## 2023-07-09 ASSESSMENT — ENCOUNTER SYMPTOMS
COLOR CHANGE: 0
SORE THROAT: 0
DIARRHEA: 0
ABDOMINAL PAIN: 0
NAUSEA: 0
FACIAL SWELLING: 0
SHORTNESS OF BREATH: 0
VOMITING: 0
RHINORRHEA: 0

## 2023-07-09 ASSESSMENT — LIFESTYLE VARIABLES
HOW MANY STANDARD DRINKS CONTAINING ALCOHOL DO YOU HAVE ON A TYPICAL DAY: PATIENT DOES NOT DRINK
HOW OFTEN DO YOU HAVE A DRINK CONTAINING ALCOHOL: NEVER

## 2023-11-29 ENCOUNTER — INITIAL CONSULT (OUTPATIENT)
Dept: SURGERY | Age: 53
End: 2023-11-29
Payer: MEDICARE

## 2023-11-29 VITALS
DIASTOLIC BLOOD PRESSURE: 72 MMHG | WEIGHT: 244 LBS | BODY MASS INDEX: 38.3 KG/M2 | HEIGHT: 67 IN | HEART RATE: 64 BPM | SYSTOLIC BLOOD PRESSURE: 114 MMHG

## 2023-11-29 DIAGNOSIS — M79.5 FOREIGN BODY (FB) IN SOFT TISSUE: Primary | ICD-10-CM

## 2023-11-29 PROCEDURE — 99203 OFFICE O/P NEW LOW 30 MIN: CPT | Performed by: SURGERY

## 2023-11-29 NOTE — PROGRESS NOTES
Department of General Surgery Consult    PATIENT NAME: Roseanna Watson OF BIRTH: 1970    ADMISSION DATE: No admission date for patient encounter. TODAY'S DATE: 2023    Reason for Consult:  foreign body abdominal wall    Chief Complaint: none    Historian: patient    Requesting Physician:  Katiana Downs    HISTORY OF PRESENT ILLNESS:              The patient is a 52 y.o. female who reports attempting an umbilical piercing about 2-3 weeks ago. It was with a plastic 1/2 inch long piece about 2-3mm in diameter with a plastic ball on each end. After it was done she stood up and one of the balls fell off and she felt that the rest of it went into her subcutaneous tissue. She reports being able to see the rest of it below the skin but could not get out. At that time it was at the site of piercing. At some point she went to her PCP where she reports it was located under [address] and attempted to remove but could not. She points to a location about 6 inches away in her LLQ where this was done. Currently she has not pain and does not feel where the object could be. Past Medical History:        Diagnosis Date    Anxiety     Bipolar disorder ([address])     COPD (chronic obstructive pulmonary disease) ([address])     Depression     Diabetes mellitus ([address])     Hyperlipidemia     Hypertension     Post traumatic stress disorder        Past Surgical History:        Procedure Laterality Date     SECTION      GALLBLADDER SURGERY      KNEE ARTHROSCOPY W/ MENISCAL REPAIR         Current Medications:   No current facility-administered medications for this visit. Prior to Admission medications    Medication Sig Start Date End Date Taking?  Authorizing Provider   hydrocortisone (ANUSOL-HC) 2.5 % CREA rectal cream Apply pea size amount to anal sphincter 3 times a day as needed for pain 23   Floresita ODONNELL, APRN - CNP   ibuprofen (ADVIL;MOTRIN) 600 MG tablet Take 1 tablet by mouth 3 times daily (with

## 2023-12-12 ENCOUNTER — HOSPITAL ENCOUNTER (OUTPATIENT)
Dept: CT IMAGING | Age: 53
Discharge: HOME | End: 2023-12-12
Attending: SURGERY
Payer: MEDICARE

## 2023-12-12 DIAGNOSIS — M79.5 FOREIGN BODY (FB) IN SOFT TISSUE: ICD-10-CM

## 2023-12-12 PROCEDURE — 74176 CT ABD & PELVIS W/O CONTRAST: CPT
